# Patient Record
Sex: MALE | Race: BLACK OR AFRICAN AMERICAN | NOT HISPANIC OR LATINO | Employment: UNEMPLOYED | URBAN - METROPOLITAN AREA
[De-identification: names, ages, dates, MRNs, and addresses within clinical notes are randomized per-mention and may not be internally consistent; named-entity substitution may affect disease eponyms.]

---

## 2017-04-05 ENCOUNTER — HOSPITAL ENCOUNTER (EMERGENCY)
Facility: OTHER | Age: 32
Discharge: HOME | End: 2017-04-05
Attending: EMERGENCY MEDICINE
Payer: MEDICAID

## 2017-04-05 VITALS
RESPIRATION RATE: 17 BRPM | HEIGHT: 75 IN | DIASTOLIC BLOOD PRESSURE: 74 MMHG | BODY MASS INDEX: 28.6 KG/M2 | TEMPERATURE: 98 F | WEIGHT: 230 LBS | HEART RATE: 73 BPM | OXYGEN SATURATION: 97 % | SYSTOLIC BLOOD PRESSURE: 138 MMHG

## 2017-04-05 DIAGNOSIS — G56.32 RADIAL NERVE PALSY, LEFT: Primary | ICD-10-CM

## 2017-04-05 PROCEDURE — 29125 APPL SHORT ARM SPLINT STATIC: CPT

## 2017-04-05 PROCEDURE — 99283 EMERGENCY DEPT VISIT LOW MDM: CPT

## 2017-04-05 NOTE — ED AVS SNAPSHOT
"          [facility]  [address]  [place] [address]               Jimi Pfeiffer   2017 10:18 AM   ED    Description:  Male : 1985   Department:  [facility]           Your Care was Coordinated By:     Provider Role From To    Jacquelin Mckeon MD Attending Provider 17 1018 --      Reason for Visit     Hand Pain           Diagnoses this Visit        Comments    Radial nerve palsy, left    -  Primary       ED Disposition     None           To Do List           Follow-up Information     Follow up with Neurology.      [facility]     [facility] Care [facility] -  Assistance  Unless otherwise directed by your provider, please contact [facility], our nurse care line that is available for  assistance.     Registered nurses in the [facility] provide: appointment scheduling, clinical advisement, health education, and other advisory services.  Call: [phone] (toll free)               Medications           Message regarding Medications     Verify the changes and/or additions to your medication regime listed below are the same as discussed with your clinician today.  If any of these changes or additions are incorrect, please notify your healthcare provider.             Verify that the below list of medications is an accurate representation of the medications you are currently taking.  If none reported, the list may be blank. If incorrect, please contact your healthcare provider. Carry this list with you in case of emergency.                Clinical Reference Information           Your Vitals Were     BP Pulse Temp Resp Height Weight    138/74 (BP Location: Right arm, Patient Position: Sitting) 73 98 °F (36.7 °C) (Oral) 17 6' 3" (1.905 m) 104.3 kg (230 lb)    SpO2 BMI             97% 28.75 kg/m2         Allergies as of 2017     No Known Allergies      Immunizations Administered on Date of Encounter - 2017     " None      ED Micro, Lab, POCT     None      ED Imaging Orders     None        Discharge Instructions       Use the splint as much as possible.    Please return to the ER if you have chest pain, difficulty breathing, fevers, altered mental status, dizziness, weakness, or any other concerns.      You will be contacted by the neurology clinic at [facility] for follow up.      Discharge References/Attachments     RADIAL NERVE PALSY (ENGLISH)      [facility] Sign-Up     Activating your [facility] account is as easy as 1-2-3!     1) Visit [email or web address], select Sign Up Now, enter this activation code and your date of birth, then select Next.  [email or web address]  Expires: 5/20/2017 10:40 AM      2) Create a username and password to use when you visit [facility] in the future and select a security question in case you lose your password and select Next.    3) Enter your e-mail address and click Sign Up!    Additional Information  If you have questions, please e-mail [email or web address] or call [phone] to talk to our [facility] staff. Remember, [facility] is NOT to be used for urgent needs. For medical emergencies, dial 911.         Smoking Cessation     If you would like to quit smoking:   You may be eligible for free services if you are a [place] resident and started smoking cigarettes before September 1, 1988.  Call the [employer]oking [employer]essation [employer] ([employer]) toll free at (926) 188-7484 or (884) 775-1529.   Call 2-800-QUIT-NOW if you do not meet the above criteria.   Contact us via email: [email or web address]   View our website for more information: [email or web address]         [facility] complies with applicable Federal civil rights laws and does not discriminate on the basis of race, color, national origin, age, disability, or sex.        Language Assistance Services     ATTENTION: Language assistance services are available, free of charge. Please call [phone].      ATENCIÓN: Yareli em  tiene a thurman disposición servicios gratuitos de asistencia lingüística. Llame al [phone].     CROW Ý: N?u b?n nói Ti?ng Vi?t, có các d?ch v? h? tr? ngôn ng? mi?n phí blankh cho b?n. G?i s? [phone].

## 2017-04-05 NOTE — ED TRIAGE NOTES
Patient c/o numbness, tingling and decreased mobility to his left hand since Sunday.  Patient does not recall any injury to the hand.  Patient denies previous occurrence.

## 2017-04-05 NOTE — PLAN OF CARE
Pt in need of neuro f/u. Has [facility]. ED MD informed of need for referral to have him f/u with [facility]. Sent over all necessary documentation for referral to fax #: [phone].     Informed patient upon d/c if there is no call from [facility] in the next 2 days to call the main [facility] number and ask them about an update on his referral. Pt given phone number on d/c paperwork.

## 2017-04-05 NOTE — ED PROVIDER NOTES
Encounter Date: 4/5/2017    SCRIBE #1 NOTE: I, Guerline Carey, am scribing for, and in the presence of, Dr. Mckeon.       History     Chief Complaint   Patient presents with    Hand Pain     pt states he is unable to move his hand upward once he moves it down.  denies any injury.  states it feel numb for several days     Review of patient's allergies indicates:  No Known Allergies  HPI Comments: Time seen by provider: 10:25 AM    This is a 31 y.o. male who presents with complaint of left wrist weakness and numbness that began over one week ago and has remained constant since. The weakness is only present with active extension of his wrist and fingers, and he is only able to do so passively.  His numbness is along the radial aspect of his hand.  He reports sleeping with his arm between his legs after drinking alcohol prior to the onset of weakness and numbness, and noted he had difficulty grabbing his phone when he woke up.  Denies pain. Denies any other injury. Denies any redness/swelling.      The history is provided by the patient and a significant other.     History reviewed. No pertinent past medical history.  History reviewed. No pertinent surgical history.  History reviewed. No pertinent family history.  Social History   Substance Use Topics    Smoking status: Current Every Day Smoker     Types: Cigarettes    Smokeless tobacco: None    Alcohol use Yes      Comment: rarely     Review of Systems   Constitutional: Negative for chills and fever.   Gastrointestinal: Negative for nausea and vomiting.   Endocrine: Negative for polyuria.   Genitourinary: Negative for dysuria.   Skin: Negative for color change and rash.   Neurological: Positive for weakness (left wrist) and numbness.       Physical Exam   Initial Vitals   BP Pulse Resp Temp SpO2   04/05/17 1017 04/05/17 1017 04/05/17 1017 04/05/17 1017 04/05/17 1017   138/74 73 17 98 °F (36.7 °C) 97 %     Physical Exam    Nursing note and vitals  "reviewed.  Constitutional: He appears well-developed and well-nourished. He is not diaphoretic. No distress.   HENT:   Head: Normocephalic and atraumatic.   Right Ear: External ear normal.   Left Ear: External ear normal.   Mouth/Throat: Oropharynx is clear and moist.   Eyes: Conjunctivae and EOM are normal. Right eye exhibits no discharge. Left eye exhibits no discharge.   Neck: Normal range of motion.   Cardiovascular: Normal rate.   Pulses:       Radial pulses are 2+ on the left side.   Pulmonary/Chest: No respiratory distress.   Musculoskeletal: He exhibits no edema or tenderness.   No swelling, erythema, or tenderness to the LUE.     Neurological: He is alert and oriented to person, place, and time.   RUE:  No abnormalities    LUE:  Inability to actively extend his metacarpals and wrist.  Passive extension and active/passive flexion intact of wrist and hand.  Pt in "wrist drop" position passively.  Sensations intact but decreased on the radial aspect of the hand. Intact bicep and triceps strength. Intact  strength.     Skin: Skin is warm and dry. No rash noted. No pallor.   Psychiatric: He has a normal mood and affect. Thought content normal.         ED Course   Procedures  Labs Reviewed - No data to display          Medical Decision Making:   History:   Old Medical Records: I decided to obtain old medical records.  Initial Assessment:   10:25AM:  Pt is a 30 y/o M who presents to ED with almost 1 week of inability to actively extend LUE wrist and fingers.  Pt appears well, nontoxic.  He is otherwise neurologically intact.  Pt appears to have a radial nerve palsy, possibly from compression when he had been intoxicated.  Will plan for wrist splint and referral to [facility] neurology for continued follow up.  I do not feel that imaging is warranted at this time..  Will plan to fax over paperwork for referral and I confirmed contact information with the pt.  I counseled pt regarding likely diagnosis along with " supportive care measures.  I have discussed with the pt ED return warnings and need for close PCP f/u.  Pt agreeable to plan and all questions answered.  I feel that pt is stable for discharge and management as an outpatient and no further intervention is needed at this time.  Pt is comfortable returning to the ED if needed.  Will DC home in stable condition.                Scribe Attestation:   Scribe #1: I performed the above scribed service and the documentation accurately describes the services I performed. I attest to the accuracy of the note.    Attending Attestation:           Physician Attestation for Scribe:  Physician Attestation Statement for Scribe #1: I, Dr. Mckeon, reviewed documentation, as scribed by Guerline Carey in my presence, and it is both accurate and complete.                 ED Course     Clinical Impression:     1. Radial nerve palsy, left             Jacquelin Mckeon MD  04/05/17 1106       Jacquelin Mckeon MD  04/05/17 1103

## 2017-04-05 NOTE — DISCHARGE INSTRUCTIONS
Use the splint as much as possible.    Please return to the ER if you have chest pain, difficulty breathing, fevers, altered mental status, dizziness, weakness, or any other concerns.      You will be contacted by the neurology clinic at [facility] for follow up.

## 2021-08-18 PROBLEM — R10.13 EPIGASTRIC ABDOMINAL PAIN: Status: ACTIVE | Noted: 2021-08-18

## 2022-03-29 ENCOUNTER — LAB VISIT (OUTPATIENT)
Dept: PRIMARY CARE CLINIC | Facility: OTHER | Age: 37
End: 2022-03-29
Payer: MEDICAID

## 2022-03-29 DIAGNOSIS — Z20.822 ENCOUNTER FOR LABORATORY TESTING FOR COVID-19 VIRUS: ICD-10-CM

## 2022-03-29 PROCEDURE — U0003 INFECTIOUS AGENT DETECTION BY NUCLEIC ACID (DNA OR RNA); SEVERE ACUTE RESPIRATORY SYNDROME CORONAVIRUS 2 (SARS-COV-2) (CORONAVIRUS DISEASE [COVID-19]), AMPLIFIED PROBE TECHNIQUE, MAKING USE OF HIGH THROUGHPUT TECHNOLOGIES AS DESCRIBED BY CMS-2020-01-R: HCPCS | Performed by: INTERNAL MEDICINE

## 2022-03-30 LAB
SARS-COV-2 RNA RESP QL NAA+PROBE: NOT DETECTED
SARS-COV-2- CYCLE NUMBER: NORMAL

## 2022-05-03 ENCOUNTER — LAB VISIT (OUTPATIENT)
Dept: PRIMARY CARE CLINIC | Facility: OTHER | Age: 37
End: 2022-05-03

## 2022-05-03 DIAGNOSIS — Z20.822 ENCOUNTER FOR LABORATORY TESTING FOR COVID-19 VIRUS: ICD-10-CM

## 2022-05-03 PROCEDURE — U0003 INFECTIOUS AGENT DETECTION BY NUCLEIC ACID (DNA OR RNA); SEVERE ACUTE RESPIRATORY SYNDROME CORONAVIRUS 2 (SARS-COV-2) (CORONAVIRUS DISEASE [COVID-19]), AMPLIFIED PROBE TECHNIQUE, MAKING USE OF HIGH THROUGHPUT TECHNOLOGIES AS DESCRIBED BY CMS-2020-01-R: HCPCS | Performed by: INTERNAL MEDICINE

## 2022-05-04 LAB
SARS-COV-2 RNA RESP QL NAA+PROBE: NOT DETECTED
SARS-COV-2- CYCLE NUMBER: NORMAL

## 2022-06-07 ENCOUNTER — LAB VISIT (OUTPATIENT)
Dept: PRIMARY CARE CLINIC | Facility: OTHER | Age: 37
End: 2022-06-07
Attending: INTERNAL MEDICINE

## 2022-06-07 DIAGNOSIS — Z20.822 ENCOUNTER FOR LABORATORY TESTING FOR COVID-19 VIRUS: ICD-10-CM

## 2022-06-07 PROCEDURE — U0003 INFECTIOUS AGENT DETECTION BY NUCLEIC ACID (DNA OR RNA); SEVERE ACUTE RESPIRATORY SYNDROME CORONAVIRUS 2 (SARS-COV-2) (CORONAVIRUS DISEASE [COVID-19]), AMPLIFIED PROBE TECHNIQUE, MAKING USE OF HIGH THROUGHPUT TECHNOLOGIES AS DESCRIBED BY CMS-2020-01-R: HCPCS | Performed by: INTERNAL MEDICINE

## 2022-06-08 LAB
SARS-COV-2 RNA RESP QL NAA+PROBE: NOT DETECTED
SARS-COV-2- CYCLE NUMBER: NORMAL

## 2023-10-05 PROBLEM — E87.1 HYPONATREMIA: Status: ACTIVE | Noted: 2023-10-05

## 2023-10-05 PROBLEM — S00.03XA SCALP HEMATOMA, INITIAL ENCOUNTER: Status: ACTIVE | Noted: 2023-10-05

## 2023-10-05 PROBLEM — E87.20 METABOLIC ACIDOSIS: Status: ACTIVE | Noted: 2023-10-05

## 2023-10-05 PROBLEM — D50.9 MICROCYTIC ANEMIA: Status: ACTIVE | Noted: 2023-10-05

## 2023-10-05 PROBLEM — F43.20 ADJUSTMENT DISORDER: Status: ACTIVE | Noted: 2023-10-05

## 2023-10-05 PROBLEM — G93.40 ENCEPHALOPATHY ACUTE: Status: ACTIVE | Noted: 2023-10-05
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
## 2023-10-06 PROBLEM — F23 BRIEF PSYCHOTIC DISORDER: Status: ACTIVE | Noted: 2023-10-06

## 2023-10-06 PROBLEM — R49.0 HOARSENESS OF VOICE: Status: ACTIVE | Noted: 2023-10-06

## 2023-11-06 PROBLEM — E87.8 ELECTROLYTE ABNORMALITY: Status: ACTIVE | Noted: 2023-11-06

## 2023-11-06 PROBLEM — S01.01XA SCALP LACERATION, INITIAL ENCOUNTER: Status: ACTIVE | Noted: 2023-11-06

## 2023-11-06 PROBLEM — F19.10 SUBSTANCE ABUSE: Status: ACTIVE | Noted: 2023-11-06
